# Patient Record
Sex: FEMALE | Employment: OTHER | URBAN - METROPOLITAN AREA
[De-identification: names, ages, dates, MRNs, and addresses within clinical notes are randomized per-mention and may not be internally consistent; named-entity substitution may affect disease eponyms.]

---

## 2022-11-20 ENCOUNTER — HOSPITAL ENCOUNTER (OUTPATIENT)
Age: 77
Discharge: HOME | End: 2022-11-20
Payer: MEDICARE

## 2022-11-20 VITALS — OXYGEN SATURATION: 97 % | TEMPERATURE: 97 F | RESPIRATION RATE: 20 BRPM | HEART RATE: 62 BPM

## 2022-11-20 DIAGNOSIS — H72.92 RUPTURED TYMPANIC MEMBRANE, LEFT: Primary | ICD-10-CM

## 2022-11-20 RX ORDER — DABIGATRAN ETEXILATE 150 MG/1
150 CAPSULE ORAL 2 TIMES DAILY
COMMUNITY

## 2022-11-20 RX ORDER — NEOMYCIN SULFATE, POLYMYXIN B SULFATE AND HYDROCORTISONE 10; 3.5; 1 MG/ML; MG/ML; [USP'U]/ML
3 SUSPENSION/ DROPS AURICULAR (OTIC) 3 TIMES DAILY
Qty: 10 ML | Refills: 0 | Status: SHIPPED | OUTPATIENT
Start: 2022-11-20 | End: 2022-11-25

## 2022-11-20 RX ORDER — METOPROLOL SUCCINATE 25 MG/1
25 TABLET, EXTENDED RELEASE ORAL DAILY
COMMUNITY

## 2022-11-20 RX ORDER — ANASTROZOLE 1 MG/1
1 TABLET ORAL DAILY
COMMUNITY

## 2022-11-20 RX ORDER — LOSARTAN POTASSIUM 50 MG/1
TABLET ORAL 2 TIMES DAILY
COMMUNITY

## 2022-11-20 RX ORDER — VIBEGRON 75 MG/1
TABLET, FILM COATED ORAL
COMMUNITY

## 2022-11-20 RX ORDER — LEVOTHYROXINE SODIUM 0.03 MG/1
25 TABLET ORAL
COMMUNITY

## 2022-11-20 RX ORDER — ATORVASTATIN CALCIUM 20 MG/1
20 TABLET, FILM COATED ORAL NIGHTLY
COMMUNITY

## 2022-11-20 RX ORDER — SPIRONOLACTONE 25 MG/1
25 TABLET ORAL DAILY
COMMUNITY

## 2022-11-20 NOTE — DISCHARGE INSTRUCTIONS
Rest and drink plenty of fluids. Do not put anything in your ears. Start the antibiotic ear drops and make sure to finish the entire prescription. Do not get any water in your ear until the antibiotics are completed. Use a cotton ball with Vaseline on it while bathing or showering. No swimming until antibiotics are completed. Take Tylenol and/or ibuprofen as needed for pain control. Follow up with your PCP in 5-7 days.

## 2022-11-20 NOTE — ED INITIAL ASSESSMENT (HPI)
Pt states she flew yesterday, felt pain and a pop in her left ear. Pt then had blood from her ear. States today no pain but continues to have drainage.